# Patient Record
Sex: FEMALE | Race: WHITE
[De-identification: names, ages, dates, MRNs, and addresses within clinical notes are randomized per-mention and may not be internally consistent; named-entity substitution may affect disease eponyms.]

---

## 2020-10-28 ENCOUNTER — HOSPITAL ENCOUNTER (EMERGENCY)
Dept: HOSPITAL 75 - ER | Age: 45
Discharge: HOME | End: 2020-10-28
Payer: SELF-PAY

## 2020-10-28 VITALS — HEIGHT: 61.81 IN | WEIGHT: 178.57 LBS | BODY MASS INDEX: 32.86 KG/M2

## 2020-10-28 VITALS — SYSTOLIC BLOOD PRESSURE: 112 MMHG | DIASTOLIC BLOOD PRESSURE: 65 MMHG

## 2020-10-28 DIAGNOSIS — U07.1: Primary | ICD-10-CM

## 2020-10-28 DIAGNOSIS — Z83.3: ICD-10-CM

## 2020-10-28 LAB
ALBUMIN SERPL-MCNC: 3.8 GM/DL (ref 3.2–4.5)
ALP SERPL-CCNC: 82 U/L (ref 40–136)
ALT SERPL-CCNC: 55 U/L (ref 0–55)
BASOPHILS # BLD AUTO: 0 10^3/UL (ref 0–0.1)
BASOPHILS NFR BLD AUTO: 0 % (ref 0–10)
BILIRUB SERPL-MCNC: 0.7 MG/DL (ref 0.1–1)
BLD SMEAR INTERP: YES
BUN/CREAT SERPL: 13
CALCIUM SERPL-MCNC: 8.5 MG/DL (ref 8.5–10.1)
CHLORIDE SERPL-SCNC: 103 MMOL/L (ref 98–107)
CO2 SERPL-SCNC: 24 MMOL/L (ref 21–32)
CREAT SERPL-MCNC: 0.78 MG/DL (ref 0.6–1.3)
EOSINOPHIL # BLD AUTO: 0 10^3/UL (ref 0–0.3)
EOSINOPHIL NFR BLD AUTO: 0 % (ref 0–10)
GFR SERPLBLD BASED ON 1.73 SQ M-ARVRAT: > 60 ML/MIN
GLUCOSE SERPL-MCNC: 151 MG/DL (ref 70–105)
HCT VFR BLD CALC: 30 % (ref 35–52)
HGB BLD-MCNC: 8.4 G/DL (ref 11.5–16)
LYMPHOCYTES # BLD AUTO: 1.5 10^3/UL (ref 1–4)
LYMPHOCYTES NFR BLD AUTO: 24 % (ref 12–44)
MANUAL DIFFERENTIAL PERFORMED BLD QL: NO
MCH RBC QN AUTO: 17 PG (ref 25–34)
MCHC RBC AUTO-ENTMCNC: 29 G/DL (ref 32–36)
MCV RBC AUTO: 59 FL (ref 80–99)
MONOCYTES # BLD AUTO: 0.4 10^3/UL (ref 0–1)
MONOCYTES NFR BLD AUTO: 7 % (ref 0–12)
NEUTROPHILS # BLD AUTO: 4.4 10^3/UL (ref 1.8–7.8)
NEUTROPHILS NFR BLD AUTO: 69 % (ref 42–75)
PLATELET # BLD: 395 10^3/UL (ref 130–400)
PMV BLD AUTO: 10.1 FL (ref 9–12.2)
POTASSIUM SERPL-SCNC: 3.5 MMOL/L (ref 3.6–5)
PROT SERPL-MCNC: 7.9 GM/DL (ref 6.4–8.2)
SODIUM SERPL-SCNC: 138 MMOL/L (ref 135–145)
WBC # BLD AUTO: 6.3 10^3/UL (ref 4.3–11)

## 2020-10-28 PROCEDURE — 71045 X-RAY EXAM CHEST 1 VIEW: CPT

## 2020-10-28 PROCEDURE — 86141 C-REACTIVE PROTEIN HS: CPT

## 2020-10-28 PROCEDURE — 80053 COMPREHEN METABOLIC PANEL: CPT

## 2020-10-28 PROCEDURE — 87635 SARS-COV-2 COVID-19 AMP PRB: CPT

## 2020-10-28 PROCEDURE — 85025 COMPLETE CBC W/AUTO DIFF WBC: CPT

## 2020-10-28 PROCEDURE — 36415 COLL VENOUS BLD VENIPUNCTURE: CPT

## 2020-10-28 NOTE — DIAGNOSTIC IMAGING REPORT
INDICATION: Cough



Portable chest 11:02 AM



Heart size and pulmonary vascularity are normal. Lungs are clear.

There are no effusions or pneumothoraces.



IMPRESSION: Negative chest.



Dictated by: 



  Dictated on workstation # [id number]

## 2020-10-28 NOTE — ED COUGH/URI
General


Chief Complaint:  Respiratory Problems


Stated Complaint:  COUGH,SOB, WEAK ,COVID EXPOSURE


Nursing Triage Note:  


[facility]  COMPLAINS OF HEADACEH AND 


GENERALIZED WEAKNESS SINCE FRIDAY.  DAUGHTER WHO LIVES IN THE SAME HOUSE IS 


COVID POSITIVE.


Sepsis Screen:  Possible Severe Sepsis Risk


Source:  patient


Exam Limitations:  no limitations





History of Present Illness


Date Seen by Provider:  Oct 28, 2020


Time Seen by Provider:  10:34


Initial Comments


With reports of cough shortness of breath weakness. Her daughter with whom she 

lives is Covid positive. This patient also developed a headache starting on 

Friday 6 days ago


Timing/Duration:  constant


Severity/Quality:  moderate


Prior Episodes/Possible Cause:  no prior episodes


Associated Symptoms:  denies symptoms





Allergies and Home Medications


Allergies


Coded Allergies:  


     No Known Drug Allergies (Unverified , 2/25/10)





Home Medications


Acetaminophen with Codeine 1 Each Tablet, 1-2 TAB PO Q4H PRN for MODERATE TO 

SEVERE PAIN


   Prescribed by: KAMALJIT FAITH on 11/17/16 1107


Docusate Sodium 100 Mg Capsule, 100 MG PO BID PRN for CONSTIPATION


   Prescribed by: KAMALJIT FAITH on 11/17/16 1107


Ferrous Sulfate 325 Mg Tablet, 325 MG PO BID, (Reported)


Ibuprofen 600 Mg Tablet, 600 MG PO Q6H


   Prescribed by: KAMALJIT FAITH on 11/17/16 1107


Prenatal Vit W-Ca,Fe,FA(<1 mg) 1 Each Tablet, 1 EACH PO DAILY, (Reported)





Patient Home Medication List


Home Medication List Reviewed:  Yes





Review of Systems


Review of Systems


Constitutional:  see HPI


EENTM:  see HPI


Respiratory:  no symptoms reported


Cardiovascular:  no symptoms reported


Genitourinary:  no symptoms reported


Musculoskeletal:  see HPI, muscle weakness


Skin:  no symptoms reported


Psychiatric/Neurological:  No Symptoms Reported, Headache


Hematologic/Lymphatic:  No Symptoms Reported


Immunological/Allergic:  no symptoms reported





Past Medical-Social-Family Hx


Patient Social History


Recent Foreign Travel:  No


Contact w/Someone Who Travel:  No


Recent Infectious Disease Expo:  No


Recent Hopitalizations:  No





Immunizations Up To Date


Tetanus Booster (TDap):  Less than 5yrs


PED Vaccines UTD:  No


Date of Influenza Vaccine:  Oct 15, 2016





Seasonal Allergies


Seasonal Allergies:  No





Past Medical History


Surgeries:  No


Respiratory:  No


Cardiac:  No


Neurological:  No


Reproductive Disorders:  No


Sexually Transmitted Disease:  No


Gastrointestinal:  No


Musculoskeletal:  No


Endocrine:  No


Cancer:  No


Psychosocial:  No


Integumentary:  No


Blood Disorders:  No (Hx of anemia)


Adverse Reaction/Blood Tranf:  No





Family Medical History





Diabetes mellitus


  19 FATHER (father)


  19 MOTHER (mother)


No Pertinent Family Hx





Physical Exam





Vital Signs - First Documented








 10/28/20





 10:15


 


Temp 36.6


 


Pulse 99


 


Resp 18


 


B/P (MAP) 112/65 (81)


 


Pulse Ox 100


 


O2 Delivery Room Air





Capillary Refill : Less Than 3 Seconds


Height: 5'1.00"


Weight: 201lbs. 4.0oz. [id number]; 32.00 BMI


Method:Stated


General Appearance:  WD/WN, no apparent distress, obese


Eyes:  Bilateral Eye Normal Inspection, Bilateral Eye PERRL, Bilateral Eye EOMI


Neck:  non-tender, full range of motion


Respiratory:  normal breath sounds, no respiratory distress, no accessory muscle

use


Gastrointestinal:  normal bowel sounds, non tender, soft


Neurologic/Psychiatric:  alert, normal mood/affect, oriented x 3


Skin:  normal color, warm/dry





Progress/Results/Core Measures


Suspected Sepsis


Recent Fever Within 48 Hours:  Yes


Infection Criteria Present:  Suspected New Infection


New/Unexplained  Altered Menta:  No


Sepsis Screen:  Possible Severe Sepsis Risk


SIRS


Temperature: 


Pulse: 99 


Respiratory Rate: 18


 


Laboratory Tests


10/28/20 10:25: 


Blood Pressure 112 /65 


Mean: 81


 











Laboratory Tests


10/28/20 10:25: 





Results/Orders


Lab Results





Laboratory Tests








Test


 10/28/20


10:25 Range/Units


 


 


Coronavirus 2019 (YOLETTE) Positive H Negative  








My Orders





Orders - BISHNU TOMLIN


Cbc With Automated Diff (10/28/20 10:30)


Comprehensive Metabolic Panel (10/28/20 10:30)


Hs C Reactive Protein (10/28/20 10:30)


Covid 19 Inhouse Test (10/28/20 10:30)


Chest 1 View, Ap/Pa Only (10/28/20 10:30)


Ed Iv/Invasive Line Start (10/28/20 10:30)





Vital Signs/I&O











 10/28/20





 10:15


 


Temp 36.6


 


Pulse 99


 


Resp 18


 


B/P (MAP) 112/65 (81)


 


Pulse Ox 100


 


O2 Delivery Room Air





Capillary Refill : Less Than 3 Seconds








Blood Pressure Mean:                    81











Departure


Impression





   Primary Impression:  


   COVID-19


Disposition:  01 HOME, SELF-CARE


Condition:  Stable





Departure-Patient Inst.


Decision time for Depature:  10:57


Referrals:  


NO,LOCAL PHYSICIAN (PCP/Family)


Primary Care Physician


Patient Instructions:  COVID19





Add. Discharge Instructions:  


1. Tylenol and ibuProfen for fever or discomfort. Stay-at-home quarantined away 

from others. [facility] will be in touch with you to 

further guide you on quarantine restrictions.





All discharge instructions reviewed with patient and/or family. Voiced 

understanding.











BISHNU TOMLIN APRN             Oct 28, 2020 10:36

## 2021-03-16 ENCOUNTER — HOSPITAL ENCOUNTER (EMERGENCY)
Dept: HOSPITAL 75 - ER | Age: 46
Discharge: HOME | End: 2021-03-16
Payer: SELF-PAY

## 2021-03-16 VITALS — DIASTOLIC BLOOD PRESSURE: 69 MMHG | SYSTOLIC BLOOD PRESSURE: 119 MMHG

## 2021-03-16 VITALS — WEIGHT: 200.62 LBS | BODY MASS INDEX: 34.67 KG/M2 | HEIGHT: 63.78 IN

## 2021-03-16 DIAGNOSIS — R51.9: Primary | ICD-10-CM

## 2021-03-16 DIAGNOSIS — I10: ICD-10-CM

## 2021-03-16 DIAGNOSIS — R20.2: ICD-10-CM

## 2021-03-16 DIAGNOSIS — Z83.3: ICD-10-CM

## 2021-03-16 LAB
BASOPHILS # BLD AUTO: 0.1 10^3/UL (ref 0–0.1)
BASOPHILS NFR BLD AUTO: 1 % (ref 0–10)
BLD SMEAR INTERP: YES
BUN/CREAT SERPL: 15
CALCIUM SERPL-MCNC: 8.8 MG/DL (ref 8.5–10.1)
CHLORIDE SERPL-SCNC: 106 MMOL/L (ref 98–107)
CO2 SERPL-SCNC: 21 MMOL/L (ref 21–32)
CREAT SERPL-MCNC: 0.67 MG/DL (ref 0.6–1.3)
EOSINOPHIL # BLD AUTO: 0.2 10^3/UL (ref 0–0.3)
EOSINOPHIL NFR BLD AUTO: 3 % (ref 0–10)
GFR SERPLBLD BASED ON 1.73 SQ M-ARVRAT: > 60 ML/MIN
GLUCOSE SERPL-MCNC: 146 MG/DL (ref 70–105)
HCT VFR BLD CALC: 36 % (ref 35–52)
HGB BLD-MCNC: 11.1 G/DL (ref 11.5–16)
LYMPHOCYTES # BLD AUTO: 2.3 10^3/UL (ref 1–4)
LYMPHOCYTES NFR BLD AUTO: 29 % (ref 12–44)
MANUAL DIFFERENTIAL PERFORMED BLD QL: NO
MCH RBC QN AUTO: 25 PG (ref 25–34)
MCHC RBC AUTO-ENTMCNC: 31 G/DL (ref 32–36)
MCV RBC AUTO: 80 FL (ref 80–99)
MONOCYTES # BLD AUTO: 0.4 10^3/UL (ref 0–1)
MONOCYTES NFR BLD AUTO: 6 % (ref 0–12)
NEUTROPHILS # BLD AUTO: 4.9 10^3/UL (ref 1.8–7.8)
NEUTROPHILS NFR BLD AUTO: 62 % (ref 42–75)
PLATELET # BLD: 414 10^3/UL (ref 130–400)
PMV BLD AUTO: 10.3 FL (ref 9–12.2)
POTASSIUM SERPL-SCNC: 3.7 MMOL/L (ref 3.6–5)
SODIUM SERPL-SCNC: 138 MMOL/L (ref 135–145)
WBC # BLD AUTO: 7.9 10^3/UL (ref 4.3–11)

## 2021-03-16 PROCEDURE — 93005 ELECTROCARDIOGRAM TRACING: CPT

## 2021-03-16 PROCEDURE — 70496 CT ANGIOGRAPHY HEAD: CPT

## 2021-03-16 PROCEDURE — 36415 COLL VENOUS BLD VENIPUNCTURE: CPT

## 2021-03-16 PROCEDURE — 80048 BASIC METABOLIC PNL TOTAL CA: CPT

## 2021-03-16 PROCEDURE — 84703 CHORIONIC GONADOTROPIN ASSAY: CPT

## 2021-03-16 PROCEDURE — 70498 CT ANGIOGRAPHY NECK: CPT

## 2021-03-16 PROCEDURE — 70450 CT HEAD/BRAIN W/O DYE: CPT

## 2021-03-16 PROCEDURE — 82962 GLUCOSE BLOOD TEST: CPT

## 2021-03-16 PROCEDURE — 85025 COMPLETE CBC W/AUTO DIFF WBC: CPT

## 2021-03-16 NOTE — ED NEUROLOGICAL PROBLEM
General


Chief Complaint:  Neurological Problems


Stated Complaint:  FACIAL NUMBNESS


Nursing Triage Note:  


PT AMBULATED TO ROOM 3 PT SPEAKS Ethiopian, INTERPRETTED BY ADULT SON. PT STATES 


SON PT STATES STARTED HAVING NUMBNESS OF FACE AND IN L ARM AND L LEG @2000 LAST 


PM. 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 WAS SEEN BY [facility] 


AND SENT TO ED FOR FURTHER WORK UP. PT DENIES PAIN.


Nursing Sepsis Screen:  No Definite Risk


Source:  patient


Exam Limitations:  no limitations





History of Present Illness


Date Seen by Provider:  Mar 16, 2021


Time Seen by Provider:  10:45


Initial Comments


Patient is a 46-year-old female who presents to the emergency department today 

with a chief complaint of left-sided headache onset around 8:00 to 830 last 

night with some left-sided weakness and numbness.  Patient states that she took 

some Tylenol and ibuprofen last night and went to bed.  She was able to sleep 

through the night but woke up with a slightly more intense headache this 

morning.  Patient attributed her headache last night to after having worked all 

day yesterday.  Patient states that she was still "numb" this morning and she 

felt like it might be muscle fatigue.  Patient states that the numbness involves

the left side of her face, her left upper extremity and left lower extremity.  

Patient never has had anything like this before.  She denies any recent traumas 

or injuries.  She has had headaches in the past but none that were associated 

with the numbness.  She denies any acute changes in vision.  No speech 

difficulties or swallowing difficulties.


Patient is a non-smoker, nondrinker and denies recreational drug use.  She is 

not on birth control pills.  Her last menstrual cycle was on March 1 and was 

normal for her.  She takes no daily medications.  She has no family history of 

early stroke.





Patient son is used as an  to facilitate communication with the 

patient as she is Swazi-speaking only.





All other review of systems reviewed and negative except as stated.


Timing/Duration:  24 hours


Severity:  mild


Associated Symptoms:  numbness in legs/feet; No slurred speech, No trouble 

walking, No vision changes





Allergies and Home Medications


Allergies


Coded Allergies:  


     No Known Drug Allergies (Unverified , 2/25/10)





Home Medications


Ferrous Sulfate 325 Mg Tablet, 325 MG PO BID, (Reported)





Patient Home Medication List


Home Medication List Reviewed:  Yes





Review of Systems


Review of Systems


Constitutional:  see HPI


Eyes:  No Symptoms Reported


Ears, Nose, Mouth, Throat:  no symptoms reported


Respiratory:  no symptoms reported


Cardiovascular:  no symptoms reported


Gastrointestinal:  no symptoms reported


Genitourinary:  no symptoms reported


Musculoskeletal:  no symptoms reported


Skin:  no symptoms reported


Psychiatric/Neurological:  Headache (Left-sided), Numbness (Left face, left 

upper extremity, left lower extremity); Denies Tingling


Endocrine:  No Symptoms Reported





All Other Systems Reviewed


Negative Unless Noted:  Yes





Past Medical-Social-Family Hx


Patient Social History


Alcohol Use:  Denies Use


Smoking Status:  Never a Smoker


Recent Infectious Disease Expo:  No


Recent Hopitalizations:  No





Immunizations Up To Date


Tetanus Booster (TDap):  Less than 5yrs


PED Vaccines UTD:  No


Date of Influenza Vaccine:  Oct 15, 2016





Seasonal Allergies


Seasonal Allergies:  No





Past Medical History


Surgeries:  No


Respiratory:  No


Cardiac:  No


Neurological:  No


Last Menstrual Period:  Mar 8, 2021


Reproductive Disorders:  No


Sexually Transmitted Disease:  No


Gastrointestinal:  No


Musculoskeletal:  No


Endocrine:  No


Cancer:  No


Psychosocial:  No


Integumentary:  No


Blood Disorders:  No (Hx of anemia)


Adverse Reaction/Blood Tranf:  No





Family Medical History





Diabetes mellitus


  19 FATHER (father)


  19 MOTHER (mother)


No Pertinent Family Hx





Physical Exam


Vital Signs





Vital Signs - First Documented








 3/16/21





 10:25


 


Temp 36.3


 


Pulse 90


 


Resp 24


 


B/P (MAP) 171/83 (112)


 


Pulse Ox 100





Capillary Refill : Less Than 3 Seconds


Height, Weight, BMI


Height: 5'1.00"


Weight: 201lbs. 4.0oz. 91.544320pu; 34.00 BMI


Method:Stated


General Appearance:  WD/WN, no apparent distress


HEENT:  PERRL/EOMI, normal ENT inspection


Neck:  full range of motion, supple


Respiratory:  lungs clear, normal breath sounds, no respiratory distress, no 

accessory muscle use


Cardiovascular:  regular rate, rhythm


Gastrointestinal:  non tender


Extremities:  non-tender, normal inspection, no pedal edema, no calf tenderness


Neurologic/Psychiatric:  CNs II-XII nml as tested, alert, normal mood/affect, 

oriented x 3, sensory deficit (left face, arm and leg)


Crainal Nerves:  normal hearing, normal speech, PERRL; No abnormal eye position,

No abnormal pupil position, No abnormal speech, No facial asymmetry, No facial 

droop; facial paresthesias; No facial weakness, No gaze palsy, No tongue 

deviation to R, No tongue deviation to L


Coordination/Gait:  normal finger to nose


Motor/Sensory:  no motor deficit, no pronator drift, sensory deficit; No weak 

motor strength RUE, No weak motor strength LUE, No weak motor strength RLE, No 

weak motor strength LLE


Skin:  normal color, warm/dry





Stroke


NIH Stroke Scale Assessment





   Level of Consciousness: 0=Alert (0), Level of Consciousness-Questions: 0=Ans

   wers both month/age (0), LOC Commands: 0=Performs both tasks (0), Visual 

   Fields: 0=No visual loss (0), Facial Movement (Facial Paresis): 0=Normal 

   symmetrical mnt (0), Motor Function-Arms Right: 0=No drift (0), Motor 

   Function-Arms Left: 0=No drift (0), Motor Function-Legs Right: 0=No drift 

   (0), Motor Function-Legs Left: 0=No drift (0), Limb Ataxia: 0=Absent (0), 

   Sensory: 0=Normal:no loss (0), Best Language: 0=No aphasia (0), Dysarthria: 

   0=Normal (0), Extinction & Inattention: 0=No abnormality (0), Total: 





Progress/Results/Core Measures


Results/Orders


Lab Results





Laboratory Tests








Test


 3/16/21


10:34 Range/Units


 


 


White Blood Count


 7.9 


 4.3-11.0


10^3/uL


 


Red Blood Count


 4.45 


 3.80-5.11


10^6/uL


 


Hemoglobin 11.1 L 11.5-16.0  g/dL


 


Hematocrit 36  35-52  %


 


Mean Corpuscular Volume 80  80-99  fL


 


Mean Corpuscular Hemoglobin 25  25-34  pg


 


Mean Corpuscular Hemoglobin


Concent 31 L


 32-36  g/dL





 


Red Cell Distribution Width   10.0-14.5  %


 


Platelet Count


 414 H


 130-400


10^3/uL


 


Mean Platelet Volume 10.3  9.0-12.2  fL


 


Immature Granulocyte % (Auto) 1   %


 


Neutrophils (%) (Auto) 62  42-75  %


 


Lymphocytes (%) (Auto) 29  12-44  %


 


Monocytes (%) (Auto) 6  0-12  %


 


Eosinophils (%) (Auto) 3  0-10  %


 


Basophils (%) (Auto) 1  0-10  %


 


Neutrophils # (Auto)


 4.9 


 1.8-7.8


10^3/uL


 


Lymphocytes # (Auto)


 2.3 


 1.0-4.0


10^3/uL


 


Monocytes # (Auto)


 0.4 


 0.0-1.0


10^3/uL


 


Eosinophils # (Auto)


 0.2 


 0.0-0.3


10^3/uL


 


Basophils # (Auto)


 0.1 


 0.0-0.1


10^3/uL


 


Immature Granulocyte # (Auto)


 0.1 


 0.0-0.1


10^3/uL


 


Sodium Level 138  135-145  MMOL/L


 


Potassium Level 3.7  3.6-5.0  MMOL/L


 


Chloride Level 106    MMOL/L


 


Carbon Dioxide Level 21  21-32  MMOL/L


 


Anion Gap 11  5-14  MMOL/L


 


Blood Urea Nitrogen 10  7-18  MG/DL


 


Creatinine


 0.67 


 0.60-1.30


MG/DL


 


Estimat Glomerular Filtration


Rate > 60 


  





 


BUN/Creatinine Ratio 15   


 


Glucose Level 146 H   MG/DL


 


Glucometer 139 H   MG/DL


 


Calcium Level 8.8  8.5-10.1  MG/DL


 


Serum Pregnancy Test,


Qualitative NEGATIVE 


 NEGATIVE  





 


Smear Scan YES   








My Orders





Orders - BANG BHAT MD


Cbc With Automated Diff (3/16/21 10:56)


Basic Metabolic Panel (3/16/21 10:56)


Hcg,Qualitative Serum (3/16/21 10:56)


Ekg Tracing (3/16/21 10:56)


Ct Head Wo (3/16/21 10:56)


Ketorolac Injection (Toradol Injection) (3/16/21 11:30)


Ct Angio Head/Neck (3/16/21 12:04)


Iohexol Injection (Omnipaque 350 Mg/Ml 1 (3/16/21 12:15)


Received Contrast (Hold Metformin- Contr (3/16/21 12:15)


Ns (Ivpb) (Sodium Chloride 0.9% Ivpb Bag (3/16/21 12:15)





Medications Given in ED





Current Medications








 Medications  Dose


 Ordered  Sig/Sunil


 Route  Start Time


 Stop Time Status Last Admin


Dose Admin


 


 Iohexol  75 ml  ONCE  ONCE


 IV  3/16/21 12:15


 3/16/21 12:37 DC 3/16/21 12:41


75 ML


 


 Ketorolac


 Tromethamine  30 mg  ONCE  ONCE


 IVP  3/16/21 11:30


 3/16/21 11:31 DC 3/16/21 11:35


30 MG


 


 Sodium Chloride  100 ml  ONCE  ONCE


 IV  3/16/21 12:15


 3/16/21 12:37 DC 3/16/21 12:41


80 ML








Vital Signs/I&O











 3/16/21





 10:25


 


Temp 36.3


 


Pulse 90


 


Resp 24


 


B/P (MAP) 171/83 (112)


 


Pulse Ox 100














Blood Pressure Mean:                    112











Progress


Progress Note :  


   Time:  12:03


Progress Note


Patient reassessed after Toradol, she states her headache is mildly improved but

the numbness is still there, may be possibly better.  Potentially some of this 

is difficult to ascertain secondary to the language barrier.  At this time I am 

going to do a CTA of her head and neck just to rule out clots.  If head and neck

CTA are negative we will consider discharge to home





CTA Brain and cervical spine normal; non contrast brain negative for ischemic 

event. Patient states feeling much better headache wise.  Still has a "numb" 

feeling in her right side "like a shot of novocaine at the dentist".  Suspect 

this may be a complex migraine variant as no appreciable pathology was found on 

imaging.  Would expect to see some indication of ischemic stroke at the 12-14 

hour connor out from onset of symptoms. Will have her continue NSAIDS and have 

close follow up with her primary care throught . Strict return precautions 

given.  Both patient and her son (acting as ) are present and 

indicate understanding of discharge instructions.  All questions are sought and 

answered.  Will have her take a baby aspirin daily.


Initial ECG Impression Date:  Mar 16, 2021


Initial ECG Impression Time:  10:55


Initial ECG Rate:  89


Initial ECG Rhythm:  Normal Sinus


Initial ECG Intervals:  Normal


Initial ECG Impression:  Normal





Departure


Impression





   Primary Impression:  


   Headache


   Qualified Codes:  G44.209 - Tension-type headache, unspecified, not 

   intractable


   Additional Impression:  


   Paresthesia


Disposition:  01 HOME, SELF-CARE


Condition:  Stable





Departure-Patient Inst.


Decision time for Depature:  14:06


Referrals:  


[facility]/BRICE BOWLING,LOCAL PHYSICIAN (PCP)


Primary Care Physician


Patient Instructions:  Headache, Adult (DC)





Add. Discharge Instructions:  


Take over the counter Alleve (naproxen) twice daily (with food) as needed for 

headache.





Start taking a baby aspirin daily.





Monitor your blood pressure once a day and keep a record of this for your follow

up with your primary care doctor.





Please come back to the Emergency Department if you have any worsening headache,

especially associated with worsening numbnes, arm or leg weakness, vision 

change, speech difficulty, or any other emergent concerning symptoms.





Please make a follow up appointment with [facility] in the next week to 10 days.


Work/School Note:  Work Release Form   Date Seen in the Emergency Department:  

Mar 16, 2021


   Return to Work:  Mar 18, 2021











BANG BHAT MD         Mar 16, 2021 10:55

## 2021-03-16 NOTE — DIAGNOSTIC IMAGING REPORT
EXAMINATION: CT head without contrast.



TECHNIQUE: Multiple contiguous axial images were obtained through

the brain without the use of intravenous contrast. All CT scans

use one or more of the following dose optimizing techniques:

automated exposure control, MA and/or KvP adjustment based on a

patient size and exam type, or iterative reconstruction. 



HISTORY: Left-sided numbness. Weakness. Headache.



COMPARISON: None available.



FINDINGS: No large acute territorial ischemia, mass, or

hemorrhage. No midline shift or mass effect. The ventricles,

cortical sulci, and basilar cisterns are patent and unremarkable.





The orbits are normal. Paranasal sinuses are normal. Mastoid air

cells are clear. No soft tissue abnormality is seen. No osseus

lesions or fractures are seen.



IMPRESSION:  

1. No large acute territorial ischemia, mass, or hemorrhage.



Dictated by: 



  Dictated on workstation # [id number]

## 2021-03-16 NOTE — DIAGNOSTIC IMAGING REPORT
PROCEDURE: CT angiography of the head and CT angiography of the

neck with and without contrast.



TECHNIQUE: Contiguous noncontrast images were obtained from the

skull base through the vertex. After intravenous contrast

administration, helical CT angiography of the neck was performed.

Source data was reformatted into 3D MIP projections. Delayed post

contrast acquisition was also obtained. Auto Exposure Controls

were utilized during the CT exam to meet ALARA standards for

radiation dose reduction. 



INDICATION:  Left sided numbness. 



Correlated with nonenhanced head CT performed earlier this same

date.



Delayed postcontrast enhanced imaging revealed no abnormal

parenchymal or meningeal enhancement, no identifiable mass. 

There is normal enhancement of the major dural venous sinuses.



NECK: The aortic arch was patent. As a variant the left common

carotid arises off the innominate. Subclavians bilaterally

patent.  Bilateral cervical vertebral arteries patent and

codominant.  Bilateral common carotids widely patent.  Carotid

bulbs and bifurcations patent.  The major bilateral external

carotid arterial branches are patent.  The bilateral cervical

internal carotid arterial segments were nonfocal.



CT ANGIOGRAM HEAD: There is atherosclerotic calcified plaques in

the cavernous segments of the carotids, mild and without

hemodynamically significant degrees of stenosis. The right A1

segment is atretic or absent, the left widely patent.  There is a

patent ACOM present with well opacified bilateral anterior

cerebral arteries.  Bilateral middle cerebral arterial segments

and primary branches appeared patent.  No large vessel occlusion,

thrombus or intraluminal filling defect.  No aneurysm is

identified. The intradural vertebral arteries are patent.  The

bilateral PCA segments are patent. 



IMPRESSION:  No hemodynamically significant stenosis, large

vessel occlusion or thrombus.  No acute appearing abnormality or

aneurysm.



Dictated by: 



  Dictated on workstation # [id number]

## 2021-06-11 ENCOUNTER — HOSPITAL ENCOUNTER (OUTPATIENT)
Dept: HOSPITAL 75 - RAD | Age: 46
End: 2021-06-11
Attending: NURSE PRACTITIONER
Payer: COMMERCIAL

## 2021-06-11 DIAGNOSIS — Z12.31: Primary | ICD-10-CM

## 2021-06-11 PROCEDURE — 77063 BREAST TOMOSYNTHESIS BI: CPT

## 2021-06-11 PROCEDURE — 77067 SCR MAMMO BI INCL CAD: CPT

## 2021-06-14 NOTE — DIAGNOSTIC IMAGING REPORT
INDICATION: Routine screening



Comparison is made with prior mammogram from 12/30/2011.



2-D and 3-D bilateral screening mammography was performed with

CAD.



Both breasts are heterogeneously dense, limiting the sensitivity

of mammography. The parenchymal pattern is stable. No mass or

malignant-appearing microcalcifications are seen. Axillae are

unremarkable.



IMPRESSION: BI-RADS Category 1



No mammographic features suspicious for malignancy are

identified.



ACR BI-RADS Category 1: Negative.

Result letter will be mailed to the patient.

Note:  At least 10% of breast cancer is not imaged by

mammography.



Dictated by: 



  Dictated on workstation # [id number]

## 2022-12-10 ENCOUNTER — HOSPITAL ENCOUNTER (EMERGENCY)
Dept: HOSPITAL 75 - ER | Age: 47
Discharge: HOME | End: 2022-12-10
Payer: SELF-PAY

## 2022-12-10 VITALS — DIASTOLIC BLOOD PRESSURE: 67 MMHG | SYSTOLIC BLOOD PRESSURE: 118 MMHG

## 2022-12-10 VITALS — SYSTOLIC BLOOD PRESSURE: 121 MMHG | DIASTOLIC BLOOD PRESSURE: 71 MMHG

## 2022-12-10 VITALS — SYSTOLIC BLOOD PRESSURE: 117 MMHG | DIASTOLIC BLOOD PRESSURE: 68 MMHG

## 2022-12-10 VITALS — BODY MASS INDEX: 34.48 KG/M2 | HEIGHT: 61.81 IN | WEIGHT: 187.39 LBS

## 2022-12-10 DIAGNOSIS — D64.9: Primary | ICD-10-CM

## 2022-12-10 LAB
BASOPHILS # BLD AUTO: 0.1 10^3/UL (ref 0–0.1)
BASOPHILS NFR BLD AUTO: 1 % (ref 0–10)
EOSINOPHIL # BLD AUTO: 0.2 10^3/UL (ref 0–0.3)
EOSINOPHIL NFR BLD AUTO: 3 % (ref 0–10)
HCT VFR BLD CALC: 25 % (ref 35–52)
HGB BLD-MCNC: 6.7 G/DL (ref 11.5–16)
LYMPHOCYTES # BLD AUTO: 2.1 10^3/UL (ref 1–4)
LYMPHOCYTES NFR BLD AUTO: 36 % (ref 12–44)
MANUAL DIFFERENTIAL PERFORMED BLD QL: NO
MCH RBC QN AUTO: 16 PG (ref 25–34)
MCHC RBC AUTO-ENTMCNC: 27 G/DL (ref 32–36)
MCV RBC AUTO: 59 FL (ref 80–99)
MONOCYTES # BLD AUTO: 0.5 10^3/UL (ref 0–1)
MONOCYTES NFR BLD AUTO: 9 % (ref 0–12)
NEUTROPHILS # BLD AUTO: 3 10^3/UL (ref 1.8–7.8)
NEUTROPHILS NFR BLD AUTO: 51 % (ref 42–75)
PLATELET # BLD: 536 10^3/UL (ref 130–400)
PMV BLD AUTO: 8.9 FL (ref 9–12.2)
WBC # BLD AUTO: 6 10^3/UL (ref 4.3–11)

## 2022-12-10 PROCEDURE — 86850 RBC ANTIBODY SCREEN: CPT

## 2022-12-10 PROCEDURE — 85025 COMPLETE CBC W/AUTO DIFF WBC: CPT

## 2022-12-10 PROCEDURE — 86901 BLOOD TYPING SEROLOGIC RH(D): CPT

## 2022-12-10 PROCEDURE — 99284 EMERGENCY DEPT VISIT MOD MDM: CPT

## 2022-12-10 PROCEDURE — 86922 COMPATIBILITY TEST ANTIGLOB: CPT

## 2022-12-10 PROCEDURE — 36415 COLL VENOUS BLD VENIPUNCTURE: CPT

## 2022-12-10 PROCEDURE — 86900 BLOOD TYPING SEROLOGIC ABO: CPT

## 2022-12-10 NOTE — ED GENERAL
General


Chief Complaint:  General Problems/Pain


Stated Complaint:  HEMOGLOBIN LOW


Source of Information:  Patient


Exam Limitations:  No Limitations





History of Present Illness


Date Seen by Provider:  Dec 10, 2022


Time Seen by Provider:  14:10


Initial Comments


Patient is a 47-year-old female who presents to the emergency department for 

evaluation of anemia.  Patient had some blood work done at PCPs office on 12/8 

that resulted today with a notable finding of her hemoglobin being 6.6.  Patient

has a history of anemia that is thought to be related to her heavy menses.  She 

states she has had some mild dizziness/lightheadedness for the last 2 months.  

Patient states she took iron supplementation at 1 point but was told to stop for

an unknown reason.  Patient denies any bloody stools or unexplained bruising.  

LMP was at the end of November and lasted approximately 9 days.  Denies any 

shortness of air. No reported history of coagulopathies or bleeding diatheses.





Allergies and Home Medications


Allergies


Coded Allergies:  


     No Known Drug Allergies (Unverified , 2/25/10)





Patient Home Medication List


Home Medication List Reviewed:  Yes


Ferrous Sulfate (Ferrous Sulfate) 325 Mg Tablet, 325 MG PO BID, (Reported)


   Entered as Reported by: AMERICA VAZQUEZ on 11/15/16 1803





Review of Systems


Review of Systems


Constitutional:  see HPI, dizziness


EENTM:  no symptoms reported


Respiratory:  no symptoms reported


Cardiovascular:  no symptoms reported


Gastrointestinal:  no symptoms reported


Genitourinary:  no symptoms reported


Musculoskeletal:  no symptoms reported


Skin:  no symptoms reported


Psychiatric/Neurological:  No Symptoms Reported


Hematologic/Lymphatic:  See HPI, Anemia


Immunological/Allergic:  no symptoms reported





Past Medical-Social-Family Hx


Patient Social History


Tobacco Use?:  No


Substance use?:  No


Alcohol Use?:  No





Immunizations Up To Date


Tetanus Booster (TDap):  Less than 5yrs


PED Vaccines UTD:  No





Seasonal Allergies


Seasonal Allergies:  No





Past Medical History


Surgery/Hospitalization HX:  


PREVIOUS HX ANEMIA, DENIES OTHER PMH


Surgeries:  No


Respiratory:  No


Cardiac:  No


Neurological:  No


Reproductive Disorders:  No


Sexually Transmitted Disease:  No


Gastrointestinal:  No


Musculoskeletal:  No


Endocrine:  No


Cancer:  No


Psychosocial:  No


Integumentary:  No


Blood Disorders:  No (Hx of anemia)


Adverse Reaction/Blood Tranf:  No





Family Medical History





Diabetes mellitus


  19 FATHER (father)


  19 MOTHER (mother)


No Pertinent Family Hx





Physical Exam


Vital Signs





Vital Signs - First Documented








 12/10/22





 14:14


 


Temp 36.9


 


Pulse 95


 


Resp 18


 


B/P (MAP) 142/67 (92)


 


Pulse Ox 99


 


O2 Delivery Room Air





Capillary Refill : Less Than 3 Seconds


Height, Weight, BMI


Height: 5'1.00"


Weight: 201lbs. 4.0oz. [id number]; 34.00 BMI


Method:Stated


General Appearance:  No Apparent Distress, WD/WN


HEENT:  PERRL/EOMI, TMs Normal, Normal ENT Inspection, Pharynx Normal


Neck:  Normal Inspection, Non Tender, Supple


Respiratory:  Chest Non Tender, Lungs Clear, Normal Breath Sounds, No Accessory 

Muscle Use, No Respiratory Distress


Cardiovascular:  Regular Rate, Rhythm


Gastrointestinal:  Non Tender, Soft


Neurologic/Psychiatric:  Alert, Oriented x3, No Motor/Sensory Deficits, Normal 

Mood/Affect


Skin:  Normal Color, Warm/Dry





Progress/Results/Core Measures


Suspected Sepsis


SIRS


Temperature: 


Pulse:  


Respiratory Rate: 


 


Laboratory Tests


12/10/22 14:19: White Blood Count 6.0


Blood Pressure  / 


Mean: 


 





Laboratory Tests


12/10/22 14:19: Platelet Count 536H








Results/Orders


Lab Results





Laboratory Tests








Test


 12/10/22


14:19 Range/Units


 


 


White Blood Count


 6.0 


 4.3-11.0


10^3/uL


 


Red Blood Count


 4.21 


 3.80-5.11


10^6/uL


 


Hemoglobin 6.7 *L 11.5-16.0  g/dL


 


Hematocrit 25 L 35-52  %


 


Mean Corpuscular Volume 59 L 80-99  fL


 


Mean Corpuscular Hemoglobin 16 L 25-34  pg


 


Mean Corpuscular Hemoglobin


Concent 27 L


 32-36  g/dL





 


Red Cell Distribution Width 20.9 H 10.0-14.5  %


 


Platelet Count


 536 H


 130-400


10^3/uL


 


Mean Platelet Volume 8.9 L 9.0-12.2  fL


 


Immature Granulocyte % (Auto) 0   %


 


Neutrophils (%) (Auto) 51  42-75  %


 


Lymphocytes (%) (Auto) 36  12-44  %


 


Monocytes (%) (Auto) 9  0-12  %


 


Eosinophils (%) (Auto) 3  0-10  %


 


Basophils (%) (Auto) 1  0-10  %


 


Neutrophils # (Auto)


 3.0 


 1.8-7.8


10^3/uL


 


Lymphocytes # (Auto)


 2.1 


 1.0-4.0


10^3/uL


 


Monocytes # (Auto)


 0.5 


 0.0-1.0


10^3/uL


 


Eosinophils # (Auto)


 0.2 


 0.0-0.3


10^3/uL


 


Basophils # (Auto)


 0.1 


 0.0-0.1


10^3/uL


 


Immature Granulocyte # (Auto)


 0.0 


 0.0-0.1


10^3/uL








My Orders





Orders - FERRISKALANI


Cbc With Automated Diff (12/10/22 14:12)


Type And Screen (12/10/22 14:12)


Vital Signs: Special (Order) (12/10/22 14:48)


Consent-Obtain Consent For (12/10/22 14:48)


Monitor S/S Transfusion Reacti (12/10/22 14:48)


Ns Iv 500 Ml (Sodium Chloride 0.9%) (12/10/22 15:00)


Red Cells Leukocytes Reduced (12/10/22 14:48)





Vital Signs/I&O











 12/10/22 12/10/22 12/10/22 12/10/22





 14:14 15:28 15:43 16:55


 


Temp 36.9 35.6 35.6 36.3


 


Pulse 95 74 70 79


 


Resp 18 18 18 18


 


B/P (MAP) 142/67 (92) 117/68 118/67 121/71


 


Pulse Ox 99 100 100 100


 


O2 Delivery Room Air Room Air Room Air Room Air





Capillary Refill : Less Than 3 Seconds


Progress Note :  


Progress Note


Patient is nontoxic and well hydrated on exam. No adventitious lung sounds or 

increased work of breathing noted on exam. Mucosal pallor noted. No petechial 

rash noted. Vital signs are reassuring without tachycardia or hypotension. 

Patient was ambulatory to the exam room without issue. CBC was obtained and 

hemoglobin was noted to be 6.7. Type and screen obtained and 1 unit of PRBC 

infused. Will d/c home with recs for supportive care and follow-up with PCP for 

persistent symptoms. Return precautions for urgent symptoms discussed. Patient 

verbalized understanding.





Departure


Impression





   Primary Impression:  


   Anemia


   Qualified Codes:  D64.9 - Anemia, unspecified


Disposition:  01 HOME, SELF-CARE


Condition:  Improved





Departure-Patient Inst.


Decision time for Depature:  16:15


Referrals:  


[facility] (PCP/Family)


Primary Care Physician


Patient Instructions:  Anemia, Possibly From Low Iron, Adult ED











KALANI FERRIS             Dec 10, 2022 14:18